# Patient Record
Sex: FEMALE | Race: WHITE | Employment: STUDENT | URBAN - NONMETROPOLITAN AREA
[De-identification: names, ages, dates, MRNs, and addresses within clinical notes are randomized per-mention and may not be internally consistent; named-entity substitution may affect disease eponyms.]

---

## 2018-07-25 ENCOUNTER — OFFICE VISIT (OUTPATIENT)
Dept: FAMILY MEDICINE CLINIC | Age: 23
End: 2018-07-25
Payer: COMMERCIAL

## 2018-07-25 VITALS
OXYGEN SATURATION: 99 % | TEMPERATURE: 97.9 F | HEART RATE: 84 BPM | HEIGHT: 67 IN | DIASTOLIC BLOOD PRESSURE: 70 MMHG | BODY MASS INDEX: 25.65 KG/M2 | WEIGHT: 163.4 LBS | SYSTOLIC BLOOD PRESSURE: 96 MMHG

## 2018-07-25 DIAGNOSIS — L70.0 ACNE VULGARIS: ICD-10-CM

## 2018-07-25 DIAGNOSIS — L02.91 CUTANEOUS ABSCESS, UNSPECIFIED SITE: ICD-10-CM

## 2018-07-25 DIAGNOSIS — M06.9 RHEUMATOID ARTHRITIS INVOLVING BOTH HANDS, UNSPECIFIED RHEUMATOID FACTOR PRESENCE: Primary | ICD-10-CM

## 2018-07-25 PROCEDURE — G8419 CALC BMI OUT NRM PARAM NOF/U: HCPCS | Performed by: CLINICAL NURSE SPECIALIST

## 2018-07-25 PROCEDURE — 99203 OFFICE O/P NEW LOW 30 MIN: CPT | Performed by: CLINICAL NURSE SPECIALIST

## 2018-07-25 PROCEDURE — 1036F TOBACCO NON-USER: CPT | Performed by: CLINICAL NURSE SPECIALIST

## 2018-07-25 PROCEDURE — G8427 DOCREV CUR MEDS BY ELIG CLIN: HCPCS | Performed by: CLINICAL NURSE SPECIALIST

## 2018-07-25 RX ORDER — NAPROXEN 500 MG/1
500 TABLET ORAL 2 TIMES DAILY WITH MEALS
COMMUNITY

## 2018-07-25 RX ORDER — SPIRONOLACTONE 50 MG/1
50 TABLET, FILM COATED ORAL 2 TIMES DAILY
COMMUNITY

## 2018-07-25 RX ORDER — NORGESTIMATE AND ETHINYL ESTRADIOL 0.25-0.035
1 KIT ORAL DAILY
COMMUNITY

## 2018-07-25 RX ORDER — DOXYCYCLINE 40 MG/1
40 CAPSULE ORAL EVERY MORNING
COMMUNITY
End: 2019-02-05

## 2018-07-25 ASSESSMENT — ENCOUNTER SYMPTOMS
BACK PAIN: 0
TROUBLE SWALLOWING: 0
CONSTIPATION: 0
COLOR CHANGE: 0
SORE THROAT: 0
SHORTNESS OF BREATH: 0
SINUS PRESSURE: 0
WHEEZING: 0
COUGH: 0
FACIAL SWELLING: 0
CHEST TIGHTNESS: 0
DIARRHEA: 0
ABDOMINAL PAIN: 0

## 2018-07-25 ASSESSMENT — PATIENT HEALTH QUESTIONNAIRE - PHQ9
SUM OF ALL RESPONSES TO PHQ9 QUESTIONS 1 & 2: 0
2. FEELING DOWN, DEPRESSED OR HOPELESS: 0
1. LITTLE INTEREST OR PLEASURE IN DOING THINGS: 0
SUM OF ALL RESPONSES TO PHQ QUESTIONS 1-9: 0

## 2018-07-25 NOTE — PROGRESS NOTES
facility-administered medications for this visit. No Known Allergies    Review of Systems   Constitutional: Negative for appetite change, chills, fatigue and fever. HENT: Negative for congestion, facial swelling, hearing loss, postnasal drip, sinus pressure, sore throat and trouble swallowing. Respiratory: Negative for cough, chest tightness, shortness of breath and wheezing. Cardiovascular: Negative for chest pain and palpitations. Gastrointestinal: Negative for abdominal pain, constipation and diarrhea. Genitourinary: Negative for difficulty urinating, dysuria, frequency, menstrual problem, urgency and vaginal discharge. Musculoskeletal: Positive for arthralgias. Negative for back pain, joint swelling and neck pain. Skin: Negative for color change and rash. Neurological: Negative for dizziness, weakness, light-headedness and headaches. Hematological: Negative for adenopathy. Bruises/bleeds easily. Psychiatric/Behavioral: Negative for confusion. The patient is not nervous/anxious. OBJECTIVE:  BP 96/70   Pulse 84   Temp 97.9 °F (36.6 °C) (Tympanic)   Ht 5' 7\" (1.702 m)   Wt 163 lb 6.4 oz (74.1 kg)   SpO2 99%   BMI 25.59 kg/m²    Physical Exam   Constitutional: She is oriented to person, place, and time. She appears well-developed and well-nourished. HENT:   Head: Normocephalic and atraumatic. Mouth/Throat: Oropharynx is clear and moist.   Eyes: Conjunctivae are normal. Pupils are equal, round, and reactive to light. Right eye exhibits no discharge. Left eye exhibits no discharge. Cardiovascular: Normal rate and regular rhythm. No murmur heard. Pulmonary/Chest: Effort normal and breath sounds normal. No respiratory distress. She has no wheezes. She has no rales. Musculoskeletal: Normal range of motion. She exhibits no deformity. Neurological: She is alert and oriented to person, place, and time. No cranial nerve deficit. Skin: Skin is warm and dry.  No rash

## 2018-08-28 ENCOUNTER — OFFICE VISIT (OUTPATIENT)
Dept: FAMILY MEDICINE CLINIC | Age: 23
End: 2018-08-28
Payer: COMMERCIAL

## 2018-08-28 VITALS
SYSTOLIC BLOOD PRESSURE: 96 MMHG | HEART RATE: 94 BPM | WEIGHT: 163.2 LBS | BODY MASS INDEX: 25.56 KG/M2 | OXYGEN SATURATION: 100 % | TEMPERATURE: 97.9 F | DIASTOLIC BLOOD PRESSURE: 72 MMHG

## 2018-08-28 DIAGNOSIS — B37.31 CANDIDA VAGINITIS: Primary | ICD-10-CM

## 2018-08-28 PROCEDURE — G8427 DOCREV CUR MEDS BY ELIG CLIN: HCPCS | Performed by: CLINICAL NURSE SPECIALIST

## 2018-08-28 PROCEDURE — G8419 CALC BMI OUT NRM PARAM NOF/U: HCPCS | Performed by: CLINICAL NURSE SPECIALIST

## 2018-08-28 PROCEDURE — 1036F TOBACCO NON-USER: CPT | Performed by: CLINICAL NURSE SPECIALIST

## 2018-08-28 PROCEDURE — 99212 OFFICE O/P EST SF 10 MIN: CPT | Performed by: CLINICAL NURSE SPECIALIST

## 2018-08-28 RX ORDER — FLUCONAZOLE 100 MG/1
100 TABLET ORAL DAILY
Qty: 7 TABLET | Refills: 0 | Status: SHIPPED | OUTPATIENT
Start: 2018-08-28 | End: 2018-09-04

## 2018-08-28 NOTE — PROGRESS NOTES
SUBJECTIVE:  Glendon Peabody is a 25 y.o. who presents today for Groin Pain (Patient c/o pain and redness in the groin/genital area.)      DORIAN Maloney presents today with a persistent redness and discomfort to her vaginal, vulvar area. She developed symptoms originally in April/May, happened to be seeing her local PCP to have her implant taken out to start OCP. She was told of possible fungal infection, used otc monistat or similar. She had resolution of pain, but has continued with some degree of redness since then. In the last week the pain has worsened. Nothing is new, no change in detergent or lotions. No excessive moisture. No recent intercourse. No vaginal discharge. No acute UTI symptoms. She is on daily antibiotic therapy for acne, but will be out soon.     Past Medical History:   Diagnosis Date    Acne vulgaris     RA [facility]umatoid arthritis) ([facility] [address]     Skin abscess      Past Surgical History:   Procedure Laterality Date    MOUTH SURGERY       Family History   Problem Relation Age of Onset    Thyroid Disease Mother     No Known Problems Father     No Known Problems Brother      Social History   Substance Use Topics    Smoking status: Never Smoker    Smokeless tobacco: Never Used    Alcohol use Yes      Comment: Social drinking only     Current Outpatient Prescriptions   Medication Sig Dispense Refill    fluconazole (DIFLUCAN) 100 MG tablet Take 1 tablet by mouth daily for 7 days 7 tablet 0    norgestimate-ethinyl estradiol (MONONESSA) 0.25-35 MG-MCG per tablet Take 1 tablet by mouth daily      naproxen (NAPROSYN) 500 MG tablet Take 500 mg by mouth 2 times daily (with meals)      methotrexate (RHEUMATREX) 2.5 MG chemo tablet Take 2.5 mg by mouth once a week      spironolactone (ALDACTONE) 50 MG tablet Take 50 mg by mouth 2 times daily      doxycycline (ORACEA) 40 MG delayed release capsule Take 40 mg by mouth every morning      Dapsone (ACZONE EX) Apply topically      Adapalene

## 2019-02-05 ENCOUNTER — OFFICE VISIT (OUTPATIENT)
Dept: FAMILY MEDICINE CLINIC | Age: 24
End: 2019-02-05
Payer: COMMERCIAL

## 2019-02-05 VITALS
SYSTOLIC BLOOD PRESSURE: 124 MMHG | DIASTOLIC BLOOD PRESSURE: 86 MMHG | WEIGHT: 165.2 LBS | HEART RATE: 78 BPM | OXYGEN SATURATION: 100 % | BODY MASS INDEX: 25.87 KG/M2 | TEMPERATURE: 98 F

## 2019-02-05 DIAGNOSIS — N76.1 CHRONIC VAGINITIS: Primary | ICD-10-CM

## 2019-02-05 PROCEDURE — 99212 OFFICE O/P EST SF 10 MIN: CPT | Performed by: CLINICAL NURSE SPECIALIST

## 2019-02-05 RX ORDER — LANOLIN ALCOHOL/MO/W.PET/CERES
1 CREAM (GRAM) TOPICAL DAILY
COMMUNITY

## 2019-02-05 RX ORDER — LEUCOVORIN CALCIUM 5 MG/1
TABLET ORAL
COMMUNITY
Start: 2019-01-25

## 2019-02-05 RX ORDER — INFLUENZA A VIRUS A/SINGAPORE/GP1908/2015 IVR-180A (H1N1) ANTIGEN (PROPIOLACTONE INACTIVATED), INFLUENZA A VIRUS A/SINGAPORE/INFIMH-16-0019/2016 IVR-186 (H3N2) ANTIGEN (PROPIOLACTONE INACTIVATED), INFLUENZA B VIRUS B/MARYLAND/15/2016 ANTIGEN (PROPIOLACTONE INACTIVATED), AND INFLUENZA B VIRUS B/PHUKET/3073/2013 BVR-1B ANTIGEN (PROPIOLACTONE INACTIVATED) 15; 15; 15; 15 UG/.5ML; UG/.5ML; UG/.5ML; UG/.5ML
INJECTION, SUSPENSION INTRAMUSCULAR
COMMUNITY
Start: 2019-01-08

## 2019-02-07 ENCOUNTER — TELEPHONE (OUTPATIENT)
Dept: FAMILY MEDICINE CLINIC | Age: 24
End: 2019-02-07

## 2019-02-07 RX ORDER — PREDNISONE 20 MG/1
20 TABLET ORAL 3 TIMES DAILY
Qty: 15 TABLET | Refills: 0 | Status: SHIPPED | OUTPATIENT
Start: 2019-02-07 | End: 2019-02-12

## 2022-05-11 ENCOUNTER — TELEPHONE (OUTPATIENT)
Dept: FAMILY MEDICINE CLINIC | Age: 27
End: 2022-05-11

## 2022-05-11 NOTE — TELEPHONE ENCOUNTER
Technically she is no longer my patient either as she has not been seen in 3 years. Assuming nothing has changed to her medical history, we need to call and see what day she started showing symptoms of COVID-19. As long as within 7 days- we can call and see if they can get her on Friday for antibody injection as long as remains symptomatic at that time. Otherwise, I would recommend she see someone since we have not seen her in 3 years and technically needs to re-establish care.

## 2022-05-11 NOTE — TELEPHONE ENCOUNTER
Patient mom, Rigoberto Mars left  stating that [facility] is home visiting. She said she sees Dr Laura Mcginnis and that she tested positive for covid on Monday and has been running a fever. Her mom wanted to know if she could get the antibody treatment or what other options does she have. She said that [facility] is on methotrexate for rheumatoid arthritis  I returned call and spoke with patient. I asked the patient does she see Dr Laura Mcginnis and she said no she has never seen her that she has seen [facility] in the past. I asked patient what symptoms is she having. She said cough, sore throat, sneezing, loss of smell and taste. She's been taking tylenol every 4 hours for fever but she hasn't had a fever since \"the other day\".